# Patient Record
(demographics unavailable — no encounter records)

---

## 2025-01-31 NOTE — DISCUSSION/SUMMARY
[FreeTextEntry1] : A - subacute vaginitis       Tyre -Raquel score of 21.6%  P- f/u 3 mos for annual exam       Vaginitis panel done     script for Mycolog oint. sent to pharmacy     Breast mammo and MRI ordered

## 2025-01-31 NOTE — PHYSICAL EXAM
[Chaperone Present] : A chaperone was present in the examining room during all aspects of the physical examination [46520] : A chaperone was present during the pelvic exam. [Labia Majora] : normal [Labia Minora] : normal [Normal] : normal [Enlarged ___ wks] : enlarged [unfilled] ~Uweeks [Anteversion] : anteverted [Uterine Adnexae] : normal [FreeTextEntry2] : Caron

## 2025-01-31 NOTE — HISTORY OF PRESENT ILLNESS
[FreeTextEntry1] : pt presents for  follow up, [facility] 1/11/25 presents evaluation of vaginal  pruritis, which began 3 weeks ago. pt used Monistat 3, and Vagisil but still has residual mild vulvar pruritis